# Patient Record
(demographics unavailable — no encounter records)

---

## 2024-10-25 NOTE — DISCUSSION/SUMMARY
[de-identified] : This is a 44-year-old female accompanied by her  complaining of ongoing left shoulder pain for many years. XR is non diagnostic  Patient complains of superior pain Maximum point of tenderness on LHBT and AC joint on exam  Plan: (1) persistent acromioclavicular joint pain and anterior long head of biceps tendon pain but improved range of motion status post debridement and capsulectomy by Dr. Chu.  Obtain MRI to determine if there is any structural abnormality.  Explained that this could be part of normal postoperative healing that could take more than 1 year from initial surgery to improve.      -Consider referral for diagnostic injection into the AC joint by interventional radiology.  Would consider in office long head of biceps tendon injection.  If cervical etiology could be ruled out, revision arthroscopic debridement with possible open AC joint and possible open biceps tenodesis would be the surgical treatment of choice of the shoulder.  (2) patient is status post two-level cervical fusion with MRI demonstrating significant left lateral recess disc encroaching on the C7 nerve root. Recommend second opinion by Dr. Carlson or Dr. Stevens to review MRI images to see if this is is problematic.  MRI to evaluate rotator cuff  Based on the patients history and physical exam findings, I am concerned about the possibility of a full-thickness rotator cuff tear. The patient has pain and subjective weakness consistent with this diagnosis. Therefore, I recommend an MRI to evaluate for a rotator cuff tear. This will also aid in evaluating for injury to the biceps labral complex and for any signs of impingement. The patient will follow-up after MRI to discuss further treatment options.     (1) We discussed a comprehensive treatment plans that included a prescription management plan involving the use of prescription strength medications to include Ibuprofen 600-800 mg TID, versus 500-650 mg Tylenol. We also discussed prescribing topical diclofenac (Voltaren gel) as well as once daily Meloxicam 15 mg. (2) The patient has More Than One chronic injuries/illnesses as outlined, discussed, and documented by ICD 10 codes listed, as well as the HPI and Plan section. There is a moderate risk of morbidity with further treatment, especially from use of prescription strength medications and possible side effects of these medications which include upset stomach and cardiac/renal issues with long term use were discussed. (3) I recommended that the patient follow-up with their medical physician to discuss any significant specific potential issues with long term use such as interactions with current medications or with exacerbation of underlying medical morbidities.   Attestation: I, Charisma REED'[place] , attest that this documentation has been prepared under the direction and in the presence of Provider Jaime Dumont MD. The documentation recorded by the scribe, in my presence, accurately reflects the service I personally performed, and the decisions made by me with my edits as appropriate. Jaime Dumont MD

## 2024-10-25 NOTE — PHYSICAL EXAM
[Left] : left shoulder [There are no fractures, subluxations or dislocations. No significant abnormalities are seen] : There are no fractures, subluxations or dislocations. No significant abnormalities are seen [Type 2 acromion] : Type 2 acromion [de-identified] : Constitutional: The general appearance of the patient is well developed, well nourished, no deformities and well groomed. Normal   Gait: Gait and function is as follows: normal gait.   Skin: Head and neck visualized skin is normal. Left upper extremity visualized skin is normal. Right upper extremity visualized skin is normal. Thoracic Skin of the thoracic spine shows visualized skin is normal.   Cardiovascular: palpable radial pulse bilaterally, good capillary refill in digits of the bilateral upper extremities and no temperature or color changes in the bilateral upper extremities.   Lymphatic: Normal Palpation of lymph nodes in the cervical.   Neurologic: fine motor control in the bilateral upper extremities is intact. Deep Tendon Reflexes in Upper and Lower Extremities Negative Trimble's in the bilateral upper extremities. The patient is oriented to time, place and person. Sensation to light touch intact in the bilateral upper extremities. Mood and Affect is normal.   Right Shoulder: Inspection of the shoulder/upper arm is as follows: There is a full, pain-free, stable range of motion of the shoulder with normal strength and no tenderness to palpation.      Left Shoulder: Inspection of the shoulder/upper arm is as follows: Stable shoulder. Palpation of the shoulder/upper arm is as follows: There is tenderness at the AC joint, proximal biceps tendon and supraspinatus tendon. Range of motion of the shoulder is as follows: Pain with internal rotation, external rotation, abduction and forward flexion. Strength of the shoulder is as follows: Supraspinatus 4/5. External Rotation 4/5. Internal Rotation 4/5. Infraspinatus 5/5 4/5. Deltoid 5/5 Ligament Stability and Special Tests of the shoulder is as follows: Neer test is positive. Dillon' test is positive. Speed's test is positive.  Neck: Inspection / Palpation of the cervical spine is as follows: There is a mild restricted range of motion of the cervical spine. Increase tone and mild tenderness to palpation. Stable ROM of cervical spine.   Back, including spine: Inspection / Palpation of the thoracic/lumbar spine is as follows: There is a full, pain free, stable range of motion of the thoracic spine with a normal tone and not tenderness to palpation..

## 2024-10-25 NOTE — HISTORY OF PRESENT ILLNESS
[de-identified] : This is a 44-year-old female accompanied by her  complaining of ongoing left shoulder pain for many years.  Has been under the care of several orthopedist patient underwent C5 C7 ACDF by Dr. Boyle 6/23.  Also underwent left shoulder arthroscopic debridement distal clavicle excision per Dr. Chu March 2024. Patient continues to report left shoulder superior and anterior pain despite physical therapy.  Overall pain has improved following her shoulder surgery.  Denies any current radicular pain. Presents for second opinion to left shoulder.  Reports she had AC joint injection 6/24 which provided 2 weeks of pain relief. Denies any recent imaging of the shoulder.

## 2024-11-15 NOTE — DATA REVIEWED
[FreeTextEntry1] : Left SH MRI [facility] 11/5/24 Moderate AC joint arthrosis.  Mild widening consistent with postsurgical changes with small AC effusion. Supraspinatus tendinosis with no evidence of tear. Shallow tear associated with superior labral junction.

## 2024-11-15 NOTE — HISTORY OF PRESENT ILLNESS
[de-identified] : This is a 44-year-old female accompanied by her  complaining of ongoing left shoulder pain for many years.  Has been under the care of several orthopedist patient underwent C5 C7 ACDF by Dr. Boyle 6/23.  Also underwent left shoulder arthroscopic debridement distal clavicle excision per Dr. Chu March 2024. Patient continues to report left shoulder superior and anterior pain despite physical therapy.  Overall pain has improved following her shoulder surgery.  Denies any current radicular pain. Presents for second opinion to left shoulder.  Reports she had AC joint injection 6/24 which provided 2 weeks of pain relief. Denies any recent imaging of the shoulder.  11/15/24: Patient returns today for repeat evaluation of left shoulder and neck pain.  She reports severe exacerbation of pain over the last several days after her office visit with a spine specialist.  Continues to note superior shoulder pain.  Range of motion is limited at this point.  Returns today to review MRI of her left shoulder.  Exam today was completed with  ID number [id number].

## 2024-11-15 NOTE — DISCUSSION/SUMMARY
[de-identified] : This is a 44-year-old female accompanied by her  complaining of ongoing left shoulder pain for many years. XR is non diagnostic  Patient underwent previous cervical spine surgery by Dr. Boyle June 2023 as well as left shoulder surgery by Dr. Meeks in March 2024. She continues to have severe left-sided neck and shoulder pain ongoing for more than 1 year.  Today we personally reviewed the MRI of the left shoulder which does demonstrate moderate AC arthrosis with mild resection.  Patient does have severe superior shoulder pain on exam today. We discussed the possibility of pain being referred from this AC joint however given the extent of her pain and guarding on exam it is likely a result of her cervical spine residual stenosis. With respect to her left shoulder we recommended ultrasound-guided AC joint injection to be performed by musculoskeletal radiologist. Patient was encouraged to gauge the amount of relief she feels from her injection in an effort to isolate how much pain is associated with her shoulder. Discussed if she experiences severe pain relief we could consider open AC joint stabilization and possible open bicep tenodesis.  With respect to her cervical spine patient was evaluate by Dr. Carlson and is recommending EMG as well as possibility of cervical spine epidural from pain management. Patient complains of superior pain Maximum point of tenderness on LHBT and AC joint on exam   Patient will follow-up 2 to 3 weeks after her AC joint injection to discuss pain relief.   (1) We discussed a comprehensive treatment plans that included a prescription management plan involving the use of prescription strength medications to include Ibuprofen 600-800 mg TID, versus 500-650 mg Tylenol. We also discussed prescribing topical diclofenac (Voltaren gel) as well as once daily Meloxicam 15 mg. (2) The patient has More Than One chronic injuries/illnesses as outlined, discussed, and documented by ICD 10 codes listed, as well as the HPI and Plan section. There is a moderate risk of morbidity with further treatment, especially from use of prescription strength medications and possible side effects of these medications which include upset stomach and cardiac/renal issues with long term use were discussed. (3) I recommended that the patient follow-up with their medical physician to discuss any significant specific potential issues with long term use such as interactions with current medications or with exacerbation of underlying medical morbidities.   Attestation: I, Charisma Magana , attest that this documentation has been prepared under the direction and in the presence of Provider Jaime Dumont MD. The documentation recorded by the scribe, in my presence, accurately reflects the service I personally performed, and the decisions made by me with my edits as appropriate. Jaime Dumont MD

## 2024-11-15 NOTE — PHYSICAL EXAM
[Left] : left shoulder [There are no fractures, subluxations or dislocations. No significant abnormalities are seen] : There are no fractures, subluxations or dislocations. No significant abnormalities are seen [Type 2 acromion] : Type 2 acromion [de-identified] : Constitutional: The general appearance of the patient is well developed, well nourished, no deformities and well groomed. Normal   Gait: Gait and function is as follows: normal gait.   Skin: Head and neck visualized skin is normal. Left upper extremity visualized skin is normal. Right upper extremity visualized skin is normal. Thoracic Skin of the thoracic spine shows visualized skin is normal.   Cardiovascular: palpable radial pulse bilaterally, good capillary refill in digits of the bilateral upper extremities and no temperature or color changes in the bilateral upper extremities.   Lymphatic: Normal Palpation of lymph nodes in the cervical.   Neurologic: fine motor control in the bilateral upper extremities is intact. Deep Tendon Reflexes in Upper and Lower Extremities Negative Trimble's in the bilateral upper extremities. The patient is oriented to time, place and person. Sensation to light touch intact in the bilateral upper extremities. Mood and Affect is normal.   Right Shoulder: Inspection of the shoulder/upper arm is as follows: There is a full, pain-free, stable range of motion of the shoulder with normal strength and no tenderness to palpation.      Left Shoulder: Inspection of the shoulder/upper arm is as follows: Stable shoulder. Palpation of the shoulder/upper arm is as follows: There is tenderness at the AC joint, proximal biceps tendon and supraspinatus tendon. Range of motion of the shoulder is as follows: Pain with internal rotation, external rotation, abduction and forward flexion. Strength of the shoulder is as follows: Supraspinatus 4/5. External Rotation 4/5. Internal Rotation 4/5. Infraspinatus 5/5 4/5. Deltoid 5/5 Ligament Stability and Special Tests of the shoulder is as follows: Neer test is positive. Dillon' test is positive. Speed's test is positive.  Neck: Inspection / Palpation of the cervical spine is as follows: There is a mild restricted range of motion of the cervical spine. Increase tone and mild tenderness to palpation. Stable ROM of cervical spine.   Back, including spine: Inspection / Palpation of the thoracic/lumbar spine is as follows: There is a full, pain free, stable range of motion of the thoracic spine with a normal tone and not tenderness to palpation..

## 2024-11-16 NOTE — HISTORY OF PRESENT ILLNESS
[de-identified] : 11/13/2024 - Patient presents to the office today for evaluation of neck pain and left arm pain. She is seen at the request of Dr. Dumont. Patient has a history of prior cervical disc herniation and prior anterior cervical disc infusion 6/12/23 with Dr. Boyle. Following procedure patient continue to experience pain in neck and left upper extremity. Further evaluation indicated left shoulder internal arrangement and she underwent left shoulder debridement on 3/24/24 with Dr. Farhad Hammond. Continue to experience numbness tingling into the fourth and fifth fingers on her left hand. She feels there is some subjective weakness in the left upper activity carrying items at times. She also complains of decreased cervical range of motion. She is right hand dominant. She has recent MRI. Has not had recent EMG or recent injections.   Injury Details: The patient is a 44 year female who presents today complaining of neck pain radiating Date of Injury/Onset: 2022 Pain:    At Rest: 7-8/10  With Activity:  7-8/10  Mechanism of injury: NKI Quality of symptoms: radiating, numbness, tingling, dull pain Improves with: nothing Worse with: constant Prior treatment: surgery, physical therapy, epidural, cyclobenzaprine, Ibuprofen. celebrex, MDP Prior Imaging: [facility] Cervical Spine 09/17/2024 Additional Information: None

## 2024-11-16 NOTE — HISTORY OF PRESENT ILLNESS
[de-identified] : 11/13/2024 - Patient presents to the office today for evaluation of neck pain and left arm pain. She is seen at the request of Dr. Dumont. Patient has a history of prior cervical disc herniation and prior anterior cervical disc infusion 6/12/23 with Dr. Boyle. Following procedure patient continue to experience pain in neck and left upper extremity. Further evaluation indicated left shoulder internal arrangement and she underwent left shoulder debridement on 3/24/24 with Dr. Farhad Hammond. Continue to experience numbness tingling into the fourth and fifth fingers on her left hand. She feels there is some subjective weakness in the left upper activity carrying items at times. She also complains of decreased cervical range of motion. She is right hand dominant. She has recent MRI. Has not had recent EMG or recent injections.   Injury Details: The patient is a 44 year female who presents today complaining of neck pain radiating Date of Injury/Onset: 2022 Pain:    At Rest: 7-8/10  With Activity:  7-8/10  Mechanism of injury: NKI Quality of symptoms: radiating, numbness, tingling, dull pain Improves with: nothing Worse with: constant Prior treatment: surgery, physical therapy, epidural, cyclobenzaprine, Ibuprofen. celebrex, MDP Prior Imaging: [facility] Cervical Spine 09/17/2024 Additional Information: None

## 2024-11-16 NOTE — DATA REVIEWED
[FreeTextEntry1] : On my interpretation of these images and reports MRI cervical spine [facility] 9/17/24 noncontrast. Status post ACDF c5 through C7. Report indicates broad-based left power central disc protrusion 1.1 X0.3 cm with herniated disc material indenting the eagle sack, causing slight mass effect on the C7 nerve in the left lateral recess C-5/6 right parav central osteophyte disc complex  I stop paperwork reviewed

## 2024-11-16 NOTE — PHYSICAL EXAM
[Normal Coordination] : normal coordination [Normal DTR UE/LE] : normal DTR UE/LE  [Normal Sensation] : normal sensation [Normal Mood and Affect] : normal mood and affect [Oriented] : oriented [Able to Communicate] : able to communicate [Normal Skin] : normal skin [No Rash] : no rash [No Ulcers] : no ulcers [No Lesions] : no lesions [No obvious lymphadenopathy in areas examined] : no obvious lymphadenopathy in areas examined [Well Developed] : well developed [Peripheral vascular exam is grossly normal] : peripheral vascular exam is grossly normal [No Respiratory Distress] : no respiratory distress [Lungs clear to auscultation bilaterally] : lungs clear to auscultation bilaterally [Normal Bowel Sounds] : normal bowel sounds [Non-Tender] : non-tender [No HSM] : no HSM [No Mass] : no mass [Biceps 2+] : biceps 2+ [Triceps 2+] : triceps 2+ [Brachioradialis 2+] : brachioradialis 2+ [] : positive Trimble reflex [FreeTextEntry3] : positive for well healed anterior incision left side of the neck.  [FreeTextEntry9] : Reduced range of motion left lateral rotation 45 left lateral bending 20. Forward flexion and extension are normal, but also reproduce pain.

## 2024-11-16 NOTE — DATA REVIEWED
[FreeTextEntry1] : On my interpretation of these images and reports MRI cervical spine [facility] 9/17/24 noncontrast. Status post ACDF c5 through C7. Report indicates broad-based left power central disc protrusion 1.1 X0.3 cm with herniated disc material indenting the eagle sack, causing slight mass effect on the C7 nerve in the left lateral recess C-5/6 right parav central osteophyte disc complex  I stop paperwork reviewed

## 2024-11-16 NOTE — HISTORY OF PRESENT ILLNESS
[de-identified] : 11/13/2024 - Patient presents to the office today for evaluation of neck pain and left arm pain. She is seen at the request of Dr. Dumont. Patient has a history of prior cervical disc herniation and prior anterior cervical disc infusion 6/12/23 with Dr. Boyle. Following procedure patient continue to experience pain in neck and left upper extremity. Further evaluation indicated left shoulder internal arrangement and she underwent left shoulder debridement on 3/24/24 with Dr. Farhad Hammond. Continue to experience numbness tingling into the fourth and fifth fingers on her left hand. She feels there is some subjective weakness in the left upper activity carrying items at times. She also complains of decreased cervical range of motion. She is right hand dominant. She has recent MRI. Has not had recent EMG or recent injections.   Injury Details: The patient is a 44 year female who presents today complaining of neck pain radiating Date of Injury/Onset: 2022 Pain:    At Rest: 7-8/10  With Activity:  7-8/10  Mechanism of injury: NKI Quality of symptoms: radiating, numbness, tingling, dull pain Improves with: nothing Worse with: constant Prior treatment: surgery, physical therapy, epidural, cyclobenzaprine, Ibuprofen. celebrex, MDP Prior Imaging: [facility] Cervical Spine 09/17/2024 Additional Information: None

## 2024-11-16 NOTE — DATA REVIEWED
[FreeTextEntry1] : On my interpretation of these images and reports [facility] 9[facility]17/24 noncontrast. Status post ACDF c5 through C7. Report indicates broad-based left power central disc protrusion 1.1 X0.3 cm with herniated disc material indenting the eagle sack, causing slight mass effect on the C7 nerve in the left lateral recess C-5/6 right parav central osteophyte disc complex  I stop paperwork reviewed

## 2024-12-06 NOTE — DISCUSSION/SUMMARY
[de-identified] : This is a 44-year-old female with ongoing left shoulder and neck pain despite multiple surgeries.  Patient underwent previous cervical spine surgery by Dr. Boyle June 2023 as well as left shoulder surgery by Dr. Meeks in March 2024. She continues to have severe left-sided neck and shoulder pain ongoing for more than 1 year.  Today we personally reviewed the MRI of the left shoulder which does demonstrate moderate AC arthrosis with mild resection.  She was referred to [facility] for outside ultrasound-guided AC joint injection however she reported very mild improvement. We discussed the fact that considering injection did not provide drastic relief her pain is likely referred from her cervical spine.  Patient does have pain management consult 12/10 with Dr. Cortez to discuss the possibility of cervical epidural. At this point we would not recommend any additional shoulder surgery until she has further evaluation from the cervical spine team.   (1) We discussed a comprehensive treatment plans that included a prescription management plan involving the use of prescription strength medications to include Ibuprofen 600-800 mg TID, versus 500-650 mg Tylenol. We also discussed prescribing topical diclofenac (Voltaren gel) as well as once daily Meloxicam 15 mg. (2) The patient has More Than One chronic injuries/illnesses as outlined, discussed, and documented by ICD 10 codes listed, as well as the HPI and Plan section. There is a moderate risk of morbidity with further treatment, especially from use of prescription strength medications and possible side effects of these medications which include upset stomach and cardiac/renal issues with long term use were discussed. (3) I recommended that the patient follow-up with their medical physician to discuss any significant specific potential issues with long term use such as interactions with current medications or with exacerbation of underlying medical morbidities.

## 2024-12-06 NOTE — PHYSICAL EXAM
[de-identified] : Constitutional: The general appearance of the patient is well developed, well nourished, no deformities and well groomed. Normal   Gait: Gait and function is as follows: normal gait.   Skin: Head and neck visualized skin is normal. Left upper extremity visualized skin is normal. Right upper extremity visualized skin is normal. Thoracic Skin of the thoracic spine shows visualized skin is normal.   Cardiovascular: palpable radial pulse bilaterally, good capillary refill in digits of the bilateral upper extremities and no temperature or color changes in the bilateral upper extremities.   Lymphatic: Normal Palpation of lymph nodes in the cervical.   Neurologic: fine motor control in the bilateral upper extremities is intact. Deep Tendon Reflexes in Upper and Lower Extremities Negative Trimble's in the bilateral upper extremities. The patient is oriented to time, place and person. Sensation to light touch intact in the bilateral upper extremities. Mood and Affect is normal.   Right Shoulder: Inspection of the shoulder/upper arm is as follows: There is a full, pain-free, stable range of motion of the shoulder with normal strength and no tenderness to palpation.      Left Shoulder: Inspection of the shoulder/upper arm is as follows: Stable shoulder. Palpation of the shoulder/upper arm is as follows: There is tenderness at the AC joint, proximal biceps tendon and supraspinatus tendon. Range of motion of the shoulder is as follows: Pain with internal rotation, external rotation, abduction and forward flexion. Strength of the shoulder is as follows: Supraspinatus 4/5. External Rotation 4/5. Internal Rotation 4/5. Infraspinatus 5/5 4/5. Deltoid 5/5 Ligament Stability and Special Tests of the shoulder is as follows: Neer test is positive. Dillon' test is positive. Speed's test is positive.  Neck: Inspection / Palpation of the cervical spine is as follows: There is a mild restricted range of motion of the cervical spine. Increase tone and mild tenderness to palpation. Stable ROM of cervical spine.   Back, including spine: Inspection / Palpation of the thoracic/lumbar spine is as follows: There is a full, pain free, stable range of motion of the thoracic spine with a normal tone and not tenderness to palpation..

## 2024-12-06 NOTE — HISTORY OF PRESENT ILLNESS
[de-identified] : This is a 44-year-old female accompanied by her  complaining of ongoing left shoulder pain for many years.  Has been under the care of several orthopedist patient underwent C5 C7 ACDF by Dr. Boyle 6/23.  Also underwent left shoulder arthroscopic debridement distal clavicle excision per Dr. Chu March 2024. Patient continues to report left shoulder superior and anterior pain despite physical therapy.  Overall pain has improved following her shoulder surgery.  Denies any current radicular pain. Presents for second opinion to left shoulder.  Reports she had AC joint injection 6/24 which provided 2 weeks of pain relief. Denies any recent imaging of the shoulder.  11/15/24: Patient returns today for repeat evaluation of left shoulder and neck pain.  She reports severe exacerbation of pain over the last several days after her office visit with a spine specialist.  Continues to note superior shoulder pain.  Range of motion is limited at this point.  Returns today to review MRI of her left shoulder.  Exam today was completed with  ID number [id number].  12/6/24: Patient returns today for follow-up of left shoulder and cervical spine pain.  She was previously referred for outside ultrasound-guided AC joint injection from anger which she reported mild improvement.  Continues to note left upper extremity radicular pain.  Also completed EMG and is pending follow-up appointment with Dr. Cortez.  Exam today was completed with  ID number [id number]

## 2024-12-10 NOTE — PHYSICAL EXAM
[de-identified] : Constitutional: - No acute distress - Well developed; well nourished   Neurological: - normal mood and affect - alert and oriented x 3   Cardiovascular: - grossly normal  Cervical Spine Exam:   Inspection: erythema (-) ecchymosis (-) rashes (-) Well-healed anterior neck scar   Palpation:                                               Cervical paraspinal tenderness:         R (+); L (+) Upper trapezius tenderness:              R (+); L (+) Rhomboids tenderness:                      R (-); L (-) Occipital Ridge:                                   R (-); L (-) Supraspinatus tenderness:                 R (-); L (-)   ROM: Reduced range of motion all planes   Pain throughout ROM testing   Strength Testing:            Right    Left Deltoid                             (5/5)    (5/5) Biceps:                            (5/5)    (5/5) Triceps:                           (5/5)    (5/5) Finger Abductors:           (5/5)    (5/5) Grasp:                             (5/5)    (5/5)   Special Testing: Spurling Test:                  R (-); L (+) Facet load test:               R (-); L (+) Trimble test:                  R (-); L (-)   Neuro: SILT throughout right upper extremity SI LT throughout left upper extremity   Reflexes: Biceps   -           R (2+); L (2+) Triceps  -           R (2+); L (2+) Brachioradialis- R (2+); L (2+)   No ankle clonus

## 2024-12-10 NOTE — REASON FOR VISIT
[Initial Consultation] : an initial pain management consultation [FreeTextEntry2] : neck/left arm pain

## 2024-12-10 NOTE — ASSESSMENT
[FreeTextEntry1] : We discussed the nature of the underlying pathology and available pain management treatment options. These included interventional, rehabilitative, pharmacological, and complementary modalities. We will proceed with the following:    Interventional treatment options: - Proceed with left PM C7-T1 KYM with fluoroscopic guidance - Defer interventional therapy for the left shoulder to orthopedic surgery - see additional instructions below    Rehabilitative options: - Continue physical therapy as per orthopedics - Participation in active HEP was discussed and encouraged as tolerated   Medication based treatment options: - Continue OTC NSAIDs on as-needed basis - See additional instructions below    Complementary treatment options: - Weight management and lifestyle modifications discussed   Additional treatment recommendations as follows: - patient will follow up with Dr. Carlson as directed - Patient was counseled as to red flag signs and when to seek urgent care. - Follow up 1-2 weeks post injection for assessment of efficacy and further treatment recommendations  The risks, benefits and alternatives of the proposed procedure were explained in detail with the patient. The risks outlined include but are not limited to infection, bleeding, post- dural puncture headache, nerve injury, a temporary increase in pain, failure to resolve symptoms, need for future interventions, allergic reaction, and possible elevation of blood sugar in diabetics if using corticosteroid.  All questions were answered to patient's apparent satisfaction, and he/she verbalized an understanding.  We have discussed the risks, benefits, and alternatives for NSAID therapy including but not limited to the risk of bleeding, thrombosis, gastric mucosal irritation/ulceration, allergic reaction and kidney dysfunction.  The patient was counseled to utilize NSAIDs concurrently with food and/or a PPI if applicable.  They were counseled to use the lowest effective dose for the shortest duration. The patient verbalizes an understanding.

## 2024-12-10 NOTE — PHYSICAL EXAM
[de-identified] : Constitutional: - No acute distress - Well developed; well nourished   Neurological: - normal mood and affect - alert and oriented x 3   Cardiovascular: - grossly normal  Cervical Spine Exam:   Inspection: erythema (-) ecchymosis (-) rashes (-) Well-healed anterior neck scar   Palpation:                                               Cervical paraspinal tenderness:         R (+); L (+) Upper trapezius tenderness:              R (+); L (+) Rhomboids tenderness:                      R (-); L (-) Occipital Ridge:                                   R (-); L (-) Supraspinatus tenderness:                 R (-); L (-)   ROM: Reduced range of motion all planes   Pain throughout ROM testing   Strength Testing:            Right    Left Deltoid                             (5/5)    (5/5) Biceps:                            (5/5)    (5/5) Triceps:                           (5/5)    (5/5) Finger Abductors:           (5/5)    (5/5) Grasp:                             (5/5)    (5/5)   Special Testing: Spurling Test:                  R (-); L (+) Facet load test:               R (-); L (+) Trimble test:                  R (-); L (-)   Neuro: SILT throughout right upper extremity SI LT throughout left upper extremity   Reflexes: Biceps   -           R (2+); L (2+) Triceps  -           R (2+); L (2+) Brachioradialis- R (2+); L (2+)   No ankle clonus

## 2024-12-10 NOTE — HISTORY OF PRESENT ILLNESS
[Neck] : neck [8] : 8 [4] : 4 [Burning] : burning [Dull/Aching] : dull/aching [Radiating] : radiating [Sharp] : sharp [Tightness] : tightness [Constant] : constant [Household chores] : household chores [Leisure] : leisure [Sleep] : sleep [Nothing helps with pain getting better] : Nothing helps with pain getting better [Sitting] : sitting [Walking] : walking [Lying in bed] : lying in bed [FreeTextEntry1] : 12/10/2024 - The patient presents for initial evaluation regarding her neck and left arm pain.  Patient was referred by Dr. Carlson.  Visit conducted with assistance of  Isamar Hughes.  Patient reports ongoing neck pain with radiation to left mid scapular area as well as the left posterior arm extending down to the hand.  Symptom distribution is described as 50% neck/50% left arm pain.  She underwent a C5-C7 ACDF on 6/12/2023 with Dr. Redd Boyle.  She reports some reduction of her symptoms following surgery.  She underwent subsequent left shoulder arthroscopy however which did not change her symptoms to any appreciable degree.  Has been actively involved in physical therapy in the past without benefit.  Has had a second opinion orthopedically with regard to her left shoulder with Dr. Dumont.  Attempted a left AC joint injection without meaningful relief.  Symptoms attributed to the cervical spine.   Subjective Weakness: No Numbness/Tingling: No Bladder/Bowel dysfunction: No Gait Abnormalities: No Fine motor coordination changes: No   Injections: Yes 1) left AC joint CSI with U/S guidance (11/15/2024) - Dr. Dumont   Pertinent Surgical History: 1) C5-C7 ACDF (6/12/23) - Dr. Boyle 2) Left shoulder debridement (3/24/24) - Dr. Farhad Chu   Imaging: MRI Cervical Spine (9/17/2024) - [facility]  Electrodiagnostic Studies: 1) EMG/NCV (11/26/2024) - [facility] Impression: 1) mild left cubital tunnel 2) mild bilateral carpal tunnel 3) predominately chronic bilateral C5, C6, C7 radiculopathy   Physician Disclaimer: I have personally reviewed and confirmed all HPI data with the patient. [] : no [FreeTextEntry6] : Pressure, numbness  [FreeTextEntry7] : LEFT ARM  [FreeTextEntry8] : driving  [de-identified] : driving  [de-identified] : C MRI AT [facility] SUITE

## 2024-12-10 NOTE — HISTORY OF PRESENT ILLNESS
[Neck] : neck [8] : 8 [4] : 4 [Burning] : burning [Dull/Aching] : dull/aching [Radiating] : radiating [Sharp] : sharp [Tightness] : tightness [Constant] : constant [Household chores] : household chores [Leisure] : leisure [Sleep] : sleep [Nothing helps with pain getting better] : Nothing helps with pain getting better [Sitting] : sitting [Walking] : walking [Lying in bed] : lying in bed [FreeTextEntry1] : 12/10/2024 - The patient presents for initial evaluation regarding her neck and left arm pain.  Patient was referred by Dr. Carlson.  Visit conducted with assistance of  Isamar Hughes.  Patient reports ongoing neck pain with radiation to left mid scapular area as well as the left posterior arm extending down to the hand.  Symptom distribution is described as 50% neck/50% left arm pain.  She underwent a C5-C7 ACDF on 6/12/2023 with Dr. Redd Boyle.  She reports some reduction of her symptoms following surgery.  She underwent subsequent left shoulder arthroscopy however which did not change her symptoms to any appreciable degree.  Has been actively involved in physical therapy in the past without benefit.  Has had a second opinion orthopedically with regard to her left shoulder with Dr. Dumont.  Attempted a left AC joint injection without meaningful relief.  Symptoms attributed to the cervical spine.   Subjective Weakness: No Numbness/Tingling: No Bladder/Bowel dysfunction: No Gait Abnormalities: No Fine motor coordination changes: No   Injections: Yes 1) left AC joint CSI with U/S guidance (11/15/2024) - Dr. Dumont   Pertinent Surgical History: 1) C5-C7 ACDF (6/12/23) - Dr. Boyle 2) Left shoulder debridement (3/24/24) - Dr. Farhad Chu   Imaging: MRI Cervical Spine (9/17/2024) - [facility]  Electrodiagnostic Studies: 1) EMG/NCV (11/26/2024) - [facility] Impression: 1) mild left cubital tunnel 2) mild bilateral carpal tunnel 3) predominately chronic bilateral C5, C6, C7 radiculopathy   Physician Disclaimer: I have personally reviewed and confirmed all HPI data with the patient. [] : no [FreeTextEntry6] : Pressure, numbness  [FreeTextEntry7] : LEFT ARM  [FreeTextEntry8] : driving  [de-identified] : driving  [de-identified] : C MRI AT [facility] SUITE

## 2025-01-15 NOTE — PROCEDURE
[FreeTextEntry3] : Date of Service: 01/15/2025   Account: [id number]  Patient: CAROLYN BRANDON   YOB: 1979  Age: 45 year  Surgeon:      Adiel Cortez D.O.  Assistant:    None  Pre-Operative Diagnosis:         Cervical Radiculopathy (M54.12)  Post Operative Diagnosis:       Cervical Radiculopathy (M54.12)  Procedure:             Cervical (C7-T1) interlaminar epidural steroid injection under fluoroscopic guidance  Anesthesia:  MAC  This procedure was carried out using fluoroscopic guidance.  The risks and benefits of the procedure were discussed extensively with the patient.  The consent of the patient was obtained and the following procedure was performed.  A timeout was performed with all essential staff present and the site and side were verified.  The patient was placed in the prone position and optimized to patient comfort.  The cervical area was prepped and draped in a sterile fashion.  The fluoroscope visualized the C7-T1 interspace using slight cephalad-caudad angulation and this area was marked.  Using sterile technique, the superficial skin was anesthetized with 1% Lidocaine.  A 20-gauge 3.5-inch Tuohy needle was advanced under fluoroscopy until ligament was engaged.  Using a contralateral oblique view, a "loss of resistance" to air technique was utilized in order to gain access to the epidural space.  After negative aspiration for heme and CSF, 1 cc of Omnipaque contrast was administered and the appropriate cervical epidurogram was obtained in the CACHORRO and A/P view as well as digital subtraction angiography.  A total injectate of 3 cc of preservative free normal saline and 40 mg of Kenalog was then injected into the epidural space while maintaining meaningful verbal contact with the patient.    Vital signs remained normal throughout the procedure.  The patient tolerated the procedure well.  There were no immediate complications from the performed procedure.  The patient was instructed to apply ice over the injection sites for twenty minutes every two hours for the next 24 hours.  Disposition:      1. The patient was advised to F/U in 1-2 weeks to assess the response to the injection.      2. The patient was also instructed to contact me immediately if there were any concerns related to the procedure performed.

## 2025-02-02 NOTE — HISTORY OF PRESENT ILLNESS
[de-identified] : 01/29/2025: Patient presents for a follow up visit. Patient had seen pain management and got an injection in her shoulder and one in her neck on 1/15/25 and states she got mild relief from the neck injection. She states she has numbness into her ring and pinky finger on her left side. A  was used today ID# [id number]  This is a 44-year-old female accompanied by her  complaining of ongoing left shoulder pain for many years.  Has been under the care of several orthopedist patient underwent C5 C7 ACDF by Dr. Boyle 6/23.  Also underwent left shoulder arthroscopic debridement distal clavicle excision per Dr. Chu March 2024. Patient continues to report left shoulder superior and anterior pain despite physical therapy.  Overall pain has improved following her shoulder surgery.  Denies any current radicular pain. Presents for second opinion to left shoulder.  Reports she had AC joint injection 6/24 which provided 2 weeks of pain relief. Denies any recent imaging of the shoulder.  11/15/24: Patient returns today for repeat evaluation of left shoulder and neck pain.  She reports severe exacerbation of pain over the last several days after her office visit with a spine specialist.  Continues to note superior shoulder pain.  Range of motion is limited at this point.  Returns today to review MRI of her left shoulder.  Exam today was completed with  ID number [id number].  12/6/24: Patient returns today for follow-up of left shoulder and cervical spine pain.  She was previously referred for outside ultrasound-guided AC joint injection from [facility] which she reported mild improvement.  Continues to note left upper extremity radicular pain.  Also completed EMG and is pending follow-up appointment with Dr. Cortez.  Exam today was completed with  ID number [id number]

## 2025-02-02 NOTE — PHYSICAL EXAM
[Normal Coordination] : normal coordination [Normal DTR UE/LE] : normal DTR UE/LE  [Normal Sensation] : normal sensation [Normal Mood and Affect] : normal mood and affect [Oriented] : oriented [Able to Communicate] : able to communicate [Normal Skin] : normal skin [No Rash] : no rash [No Ulcers] : no ulcers [No Lesions] : no lesions [No obvious lymphadenopathy in areas examined] : no obvious lymphadenopathy in areas examined [Well Developed] : well developed [Peripheral vascular exam is grossly normal] : peripheral vascular exam is grossly normal [No Respiratory Distress] : no respiratory distress [de-identified] : Constitutional: The general appearance of the patient is well developed, well nourished, no deformities and well groomed. Normal   Gait: Gait and function is as follows: normal gait.   Skin: Head and neck visualized skin is normal. Left upper extremity visualized skin is normal. Right upper extremity visualized skin is normal. Thoracic Skin of the thoracic spine shows visualized skin is normal.   Cardiovascular: palpable radial pulse bilaterally, good capillary refill in digits of the bilateral upper extremities and no temperature or color changes in the bilateral upper extremities.   Lymphatic: Normal Palpation of lymph nodes in the cervical.   Neurologic: fine motor control in the bilateral upper extremities is intact. Deep Tendon Reflexes in Upper and Lower Extremities Negative Trimble's in the bilateral upper extremities. The patient is oriented to time, place and person. Sensation to light touch intact in the bilateral upper extremities. Mood and Affect is normal.   Right Shoulder: Inspection of the shoulder/upper arm is as follows: There is a full, pain-free, stable range of motion of the shoulder with normal strength and no tenderness to palpation.      Left Shoulder: Inspection of the shoulder/upper arm is as follows: Stable shoulder. Palpation of the shoulder/upper arm is as follows: There is tenderness at the AC joint, proximal biceps tendon and supraspinatus tendon. Range of motion of the shoulder is as follows: Pain with internal rotation, external rotation, abduction and forward flexion. Strength of the shoulder is as follows: Supraspinatus 4/5. External Rotation 4/5. Internal Rotation 4/5. Infraspinatus 5/5 4/5. Deltoid 5/5 Ligament Stability and Special Tests of the shoulder is as follows: Neer test is positive. Dillon' test is positive. Speed's test is positive.  Neck: Inspection / Palpation of the cervical spine is as follows: There is a mild restricted range of motion of the cervical spine. Increase tone and mild tenderness to palpation. Stable ROM of cervical spine.   Back, including spine: Inspection / Palpation of the thoracic/lumbar spine is as follows: There is a full, pain free, stable range of motion of the thoracic spine with a normal tone and not tenderness to palpation..   [Extension] : extension [] : sensation of left upper extremities intact

## 2025-02-02 NOTE — DATA REVIEWED
[FreeTextEntry1] : Left SH MRI [facility] 11/5/24 Moderate AC joint arthrosis.  Mild widening consistent with postsurgical changes with small AC effusion. Supraspinatus tendinosis with no evidence of tear. Shallow tear associated with superior labral junction.  I stop paperwork reviewed Pain mgmt progress notes reviewed Ortho progress notes reviewed

## 2025-02-02 NOTE — PHYSICAL EXAM
[Normal Coordination] : normal coordination [Normal DTR UE/LE] : normal DTR UE/LE  [Normal Sensation] : normal sensation [Normal Mood and Affect] : normal mood and affect [Oriented] : oriented [Able to Communicate] : able to communicate [Normal Skin] : normal skin [No Rash] : no rash [No Ulcers] : no ulcers [No Lesions] : no lesions [No obvious lymphadenopathy in areas examined] : no obvious lymphadenopathy in areas examined [Well Developed] : well developed [Peripheral vascular exam is grossly normal] : peripheral vascular exam is grossly normal [No Respiratory Distress] : no respiratory distress [de-identified] : Constitutional: The general appearance of the patient is well developed, well nourished, no deformities and well groomed. Normal   Gait: Gait and function is as follows: normal gait.   Skin: Head and neck visualized skin is normal. Left upper extremity visualized skin is normal. Right upper extremity visualized skin is normal. Thoracic Skin of the thoracic spine shows visualized skin is normal.   Cardiovascular: palpable radial pulse bilaterally, good capillary refill in digits of the bilateral upper extremities and no temperature or color changes in the bilateral upper extremities.   Lymphatic: Normal Palpation of lymph nodes in the cervical.   Neurologic: fine motor control in the bilateral upper extremities is intact. Deep Tendon Reflexes in Upper and Lower Extremities Negative Trimble's in the bilateral upper extremities. The patient is oriented to time, place and person. Sensation to light touch intact in the bilateral upper extremities. Mood and Affect is normal.   Right Shoulder: Inspection of the shoulder/upper arm is as follows: There is a full, pain-free, stable range of motion of the shoulder with normal strength and no tenderness to palpation.      Left Shoulder: Inspection of the shoulder/upper arm is as follows: Stable shoulder. Palpation of the shoulder/upper arm is as follows: There is tenderness at the AC joint, proximal biceps tendon and supraspinatus tendon. Range of motion of the shoulder is as follows: Pain with internal rotation, external rotation, abduction and forward flexion. Strength of the shoulder is as follows: Supraspinatus 4/5. External Rotation 4/5. Internal Rotation 4/5. Infraspinatus 5/5 4/5. Deltoid 5/5 Ligament Stability and Special Tests of the shoulder is as follows: Neer test is positive. Dillon' test is positive. Speed's test is positive.  Neck: Inspection / Palpation of the cervical spine is as follows: There is a mild restricted range of motion of the cervical spine. Increase tone and mild tenderness to palpation. Stable ROM of cervical spine.   Back, including spine: Inspection / Palpation of the thoracic/lumbar spine is as follows: There is a full, pain free, stable range of motion of the thoracic spine with a normal tone and not tenderness to palpation..   [Extension] : extension [] : sensation of left upper extremities intact

## 2025-02-02 NOTE — DISCUSSION/SUMMARY
[de-identified] : This is a 44-year-old female with ongoing left shoulder and neck pain despite multiple surgeries.  Patient underwent previous cervical spine surgery by Dr. Boyle June 2023 as well as left shoulder surgery by Dr. Meeks in March 2024. She continues to have severe left-sided neck and shoulder pain ongoing for more than 1 year.  Since the patient did not have dramatic improvement from KYM we do not recommend any additional cervical surgery or epidural injections to the neck. At this time we will continue with trigger point injections and recommended acupuncture, referral Dr. Dunlap given today.     Follow up: 6 weeks    (1) We discussed a comprehensive treatment plans that included a prescription management plan involving the use of prescription strength medications to include Ibuprofen 600-800 mg TID, versus 500-650 mg Tylenol. We also discussed prescribing topical diclofenac (Voltaren gel) as well as once daily Meloxicam 15 mg. (2) The patient has More Than One chronic injuries/illnesses as outlined, discussed, and documented by ICD 10 codes listed, as well as the HPI and Plan section. There is a moderate risk of morbidity with further treatment, especially from use of prescription strength medications and possible side effects of these medications which include upset stomach and cardiac/renal issues with long term use were discussed. (3) I recommended that the patient follow-up with their medical physician to discuss any significant specific potential issues with long term use such as interactions with current medications or with exacerbation of underlying medical morbidities.    I, Renetta Clemens, attest that this documentation has been prepared under the direction and in the presence of Provider Ron Carlson MD.

## 2025-02-02 NOTE — HISTORY OF PRESENT ILLNESS
[de-identified] : 01/29/2025: Patient presents for a follow up visit. Patient had seen pain management and got an injection in her shoulder and one in her neck on 1/15/25 and states she got mild relief from the neck injection. She states she has numbness into her ring and pinky finger on her left side. A  was used today ID# [id number]  This is a 44-year-old female accompanied by her  complaining of ongoing left shoulder pain for many years.  Has been under the care of several orthopedist patient underwent C5 C7 ACDF by Dr. Boyle 6/23.  Also underwent left shoulder arthroscopic debridement distal clavicle excision per Dr. Chu March 2024. Patient continues to report left shoulder superior and anterior pain despite physical therapy.  Overall pain has improved following her shoulder surgery.  Denies any current radicular pain. Presents for second opinion to left shoulder.  Reports she had AC joint injection 6/24 which provided 2 weeks of pain relief. Denies any recent imaging of the shoulder.  11/15/24: Patient returns today for repeat evaluation of left shoulder and neck pain.  She reports severe exacerbation of pain over the last several days after her office visit with a spine specialist.  Continues to note superior shoulder pain.  Range of motion is limited at this point.  Returns today to review MRI of her left shoulder.  Exam today was completed with  ID number [id number].  12/6/24: Patient returns today for follow-up of left shoulder and cervical spine pain.  She was previously referred for outside ultrasound-guided AC joint injection from [facility] which she reported mild improvement.  Continues to note left upper extremity radicular pain.  Also completed EMG and is pending follow-up appointment with Dr. Cortez.  Exam today was completed with  ID number [id number]

## 2025-02-02 NOTE — DISCUSSION/SUMMARY
[de-identified] : This is a 44-year-old female with ongoing left shoulder and neck pain despite multiple surgeries.  Patient underwent previous cervical spine surgery by Dr. Boyle June 2023 as well as left shoulder surgery by Dr. Meeks in March 2024. She continues to have severe left-sided neck and shoulder pain ongoing for more than 1 year.  Since the patient did not have dramatic improvement from KYM we do not recommend any additional cervical surgery or epidural injections to the neck. At this time we will continue with trigger point injections and recommended acupuncture, referral Dr. Dunlap given today.     Follow up: 6 weeks    (1) We discussed a comprehensive treatment plans that included a prescription management plan involving the use of prescription strength medications to include Ibuprofen 600-800 mg TID, versus 500-650 mg Tylenol. We also discussed prescribing topical diclofenac (Voltaren gel) as well as once daily Meloxicam 15 mg. (2) The patient has More Than One chronic injuries/illnesses as outlined, discussed, and documented by ICD 10 codes listed, as well as the HPI and Plan section. There is a moderate risk of morbidity with further treatment, especially from use of prescription strength medications and possible side effects of these medications which include upset stomach and cardiac/renal issues with long term use were discussed. (3) I recommended that the patient follow-up with their medical physician to discuss any significant specific potential issues with long term use such as interactions with current medications or with exacerbation of underlying medical morbidities.    I, Renetta Clemens, attest that this documentation has been prepared under the direction and in the presence of Provider Ron Carlson MD.

## 2025-02-04 NOTE — HISTORY OF PRESENT ILLNESS
[FreeTextEntry1] : A  was used today ID#[id number] Patient is a 45-year-old female who presents for evaluation of CS and left shoulder pain.  Patient reports that she has been experiencing cervical spine pain for approximately 3 years.  Patient is status post cervical fusion performed by Dr. Boyle June 2023.  Patient also underwent arthroscopic acromioplasty with debridement performed by Dr. Chu March 2024.  Patient does report some improvement following cervical fusion.  However she continues to complain of cervical spine pain with intermittent radicular symptoms involving her left upper extremity.  She reports left shoulder pain with limited range of motion and weakness.  Patient reports that she underwent postoperative physical therapy to both her cervical spine and left shoulder.  Patient reports she also has undergone pain management intervention (cervical NAV) without significant improvement reported.  In addition a CSI injections to the left shoulder have been performed postoperatively once again without significant improvement reported.  Patient referred to my office today for evaluation of acupuncture/TPI to address myofascial component of her cervical and left shoulder complaints.

## 2025-02-04 NOTE — END OF VISIT
[FreeTextEntry3] :  The following information has been documented by Stephanie Ulloa acting as a scribe. The documentation recorded by the scribe, in my presence, accurately reflects the service I, Dr. Dunlap, personally performed, and the decisions made by me with my edits as appropriate.   This note was generated by using Dragon medical dictation software. A reasonable effort has been made for proofreading its contents, but typos may still remain. If there are any questions or points of clarification needed, please notify my office.

## 2025-02-04 NOTE — PHYSICAL EXAM
[FreeTextEntry1] : EXAMINATION OF THE CERVICAL SPINE AND UPPER EXTREMITIES: Upon inspection: Anterior surgical site noted.  Pt is aware and alert and answered all questions cooperatively. Cranial nerve testing was intact.   Smell and taste were not tested.   Visual fields were full.  There was no difficulty with finger to nose response.   Romberg testing was negative.   There was no dysmetria of the upper extremities. Reflexes revealed 2+ and symmetrical  Manual muscle testing of the bilateral upper extremities defer proximal testing to left shoulder exam otherwise intact.  Sensory examination revealed sensation was intact.  Vibratory and proprioceptive testing were intact.   Peripheral pulses were palpable bilaterally.   Trimble Test was negative.   Tinel's Test was negative at the wrists bilaterally.   The Spurling Cervical Compression Test was  equivocal   The Adsons Maneuver was negative bilaterally.  No scapular winging was noted.   There was good scapular mobility.   Range of motion testing was performed with the use of a goniometer.  On range of motion testing, flexion was to 40 degrees (normal - 45), extension was to 25 degrees  (normal - 55), right rotation was to 65 degrees (normal - 70), left rotation was to 55 degrees (normal - 70), right lateral bending was to 20 degrees  (normal - 40), and left lateral bending was to 25 degrees (normal - 40).   On palpation, of the cervical spine there was tenderness involving numerous cervical spinous processes. Tenderness and spasm involving the bilateral cervical paraspinal musculature with greater involvement on the left. Trigger points involving the bilateral trapezii musculature with greater involvement on the left. Tenderness and spasm involving bilateral levator scapulae with greater involvement on the left.   EXAMINATION OF THE LEFT SHOULDER:  Upon inspection, arthroscopic sites noted.  On palpation, tenderness involving greater trochanteric region. Bicipital tendon is tender.  Range of motion testing was performed with the use of a goniometer.   On range of motion testing, active flexion was to 150 degrees,  (normal flexion - 180),  active abduction was to 145 degrees,  (normal abduction - 180), active external rotation was to 70 degrees (normal external rotation - 90),  active internal rotation was to 45 degrees (normal internal rotation - 90), active extension was to 40 degrees, (normal extension - 45-60).   Drop Arm Test was positive  Yergasons is negative  Anterior and Posterior Apprehension Testing was negative There was a positive Impingement Sign.   Supraspinatus Test was positive  MMT; Flexion 4+/5 abduction 4/5, external rotation 5-/5, internal rotation 5/5.

## 2025-02-04 NOTE — ASSESSMENT
[FreeTextEntry1] :  Prednisone 5 mg 2 tabs po q6 hrs x 4 days  1 tab 5mg q6hrs x 3 days  1 tab 5mg po q12hrs x 2 days  1 tab 5 mg po qd x 1 day  # 49   Skelaxin 800 mg po TID # 60     Reviewed home exercise program with patient.  Stressed the importance of cervical spine range of motion trap and rhomboid stretching scapular mobilization isometrics to the C-spine, proper posturing and body mechanics as well as ergonomics Recheck in 2 to 3 weeks if symptoms persist will initiate trial of TPI therapy. Goals of which are to decrease pain, dissipate muscle spasm, increase ROM and improve level of function.  (Patient's insurance carrier does not provide acupuncture benefits)  At least 60 minutes was spent with patient face to face examining patient, reviewing findings/results, counseling patient and coordinating treatment program. Ample time was provided to answer any questions or address concerns to the patients satisfaction.

## 2025-03-05 NOTE — ASSESSMENT
[FreeTextEntry1] : Skelaxin 800 mg po TID # 60 PRN    Reviewed home exercise program with patient.  Stressed the importance of cervical spine range of motion trap and rhomboid stretching scapular mobilization isometrics to the C-spine, proper posturing and body mechanics as well as ergonomics  Recheck in 2 to 3 weeks if symptoms persist will initiate trial of TPI therapy. Goals of which are to decrease pain, dissipate muscle spasm, increase ROM and improve level of function.  (Patient's insurance carrier does not provide acupuncture benefits)   At least 20 minutes was spent with patient face to face examining patient, reviewing findings/results, counseling patient and coordinating treatment program. Ample time was provided to answer any questions or address concerns to the patient's satisfaction.

## 2025-03-05 NOTE — PHYSICAL EXAM
[FreeTextEntry1] : EXAMINATION OF THE CERVICAL SPINE AND UPPER EXTREMITIES: Reflexes revealed 2+ and symmetrical  Manual muscle testing of the bilateral upper extremities defer proximal testing to left shoulder exam otherwise intact.  Sensory examination revealed sensation was intact.  Vibratory and proprioceptive testing were intact.   The Spurling Cervical Compression Test was negative  No scapular winging was noted.   There was good scapular mobility.   Range of motion testing was performed with the use of a goniometer.  On range of motion testing, flexion was to 40 degrees (normal - 45), extension was to 20 degrees  (normal - 55), right rotation was to 65 degrees (normal - 70), left rotation was to 50 degrees (normal - 70), right lateral bending was to 20 degrees  (normal - 40), and left lateral bending was to 25 degrees (normal - 40).   On palpation, of the cervical spine there was tenderness involving numerous cervical spinous processes. Tenderness and spasm involving the bilateral cervical paraspinal musculature with greater involvement on the left. Trigger points involving the bilateral trapezii musculature with greater involvement on the left. Tenderness and spasm involving bilateral levator scapulae with greater involvement on the left.   EXAMINATION OF THE LEFT SHOULDER:  Upon inspection, arthroscopic sites noted.  On palpation, tenderness involving greater trochanteric region. Bicipital tendon is tender.  Range of motion testing was performed with the use of a goniometer.   On range of motion testing, active flexion was to 165 degrees,  (normal flexion - 180),  active abduction was to 160 ndegrees,  (normal abduction - 180), active external rotation was to 80 degrees (normal external rotation - 90),  active internal rotation was to 60 degrees (normal internal rotation - 90), active extension was to 40 degrees, (normal extension - 45-60).   Drop Arm Test was negative  Yergasons is negative  Anterior and Posterior Apprehension Testing was negative There was a mildly positive Impingement Sign.   Supraspinatus Test was mildly positive  MMT; Flexion 5-/5 abduction 4+/5, external rotation 5-/5, internal rotation 5/5.

## 2025-03-05 NOTE — END OF VISIT
[FreeTextEntry3] :    This note was generated by using Dragon medical dictation software. A reasonable effort has been made for proofreading its contents, but typos may still remain. If there are any questions or points of clarification needed, please notify my office.

## 2025-03-19 NOTE — HISTORY OF PRESENT ILLNESS
[FreeTextEntry1] : Patient reports overall improvement however she continues to complain of C-spine pain with greater involvement on the left with associated cervical paraspinal parascapular muscle spasm.  Presents today for reevaluation

## 2025-03-19 NOTE — PHYSICAL EXAM
[FreeTextEntry1] : EXAMINATION OF THE CERVICAL SPINE AND UPPER EXTREMITIES: Reflexes revealed 2+ and symmetrical  Manual muscle testing mild weakness with right shoulder flexion and abduction otherwise grossly intact Sensory examination revealed sensation was intact.  Vibratory and proprioceptive testing were intact.   The Spurling Cervical Compression Test was negative  No scapular winging was noted.   There was good scapular mobility.   Range of motion testing was performed with the use of a goniometer.  On range of motion testing, flexion was to 45 degrees (normal - 45), extension was to 25 degrees  (normal - 55), right rotation was to 65 degrees (normal - 70), left rotation was to 55 degrees (normal - 70), right lateral bending was to 20 degrees  (normal - 40), and left lateral bending was to 25 degrees (normal - 40).   On palpation, of the cervical spine : Isolated trigger points identified involving the left trapezius left levator scapula and left supraspinatus musculature After today's examination additional trigger points were identified involving the left trapezius left levator scapula and left supraspinatus musculature, thus indicating the need for additional trigger point therapy.  Goals of which are to decrease pain, dissipate muscle spasm, increase ROM and improve level of function.   Sterile Technique Injection 2 Syringes of 5 cc 1 % Lidocaine HCL left trapezius left levator scapula and left supraspinatus musculature Ice injection site PRN Injection tolerated well.   Multiple areas were identified using palpation guidance. Using aseptic technique, each of these areas left trapezius left levator scapula and left supraspinatus musculature were isolated and the skin prepped with alcohol. A 22 gauge 1 inch needle was inserted to the level of the muscle. At this point, a slight twitch was elicited and in some cases the patient identified referred pain to areas distant from the injection site. All of these were consistent with the definition of a trigger point. Aspiration revealed no blood and a mixture of 5cc 1 % Lidocaine HCL was injected in increments of 3-4 mL into each of these areas for a total of 4 sites. The needle was removed. Hemostasis was achieved with direct pressure.  The patient tolerated the procedure well. Post-procedure exam did not reveal any new neurological deficits. The patient was instructed to call with fever, chills, increased pain, redness or swelling at the injection site, or numbness or weakness in the upper extremities. The patient was discharged home in good condition with post-procedural instructions  At least 20 minutes was spent with patient face to face examining patient, reviewing findings/results, counseling patient and coordinating treatment program. Ample time was provided to answer any questions or address concerns to the patient's satisfaction.   Recheck 1- 2 weeks to assess the need for continuing TPI therapy.

## 2025-03-19 NOTE — ASSESSMENT
[FreeTextEntry1] :    Reviewed home exercise program with patient.  Stressed the importance of cervical spine range of motion trap and rhomboid stretching scapular mobilization isometrics to the C-spine, proper posturing and body mechanics as well as ergonomics

## 2025-03-26 NOTE — HISTORY OF PRESENT ILLNESS
[FreeTextEntry1] : Patient reports tolerating last session of TPI therapy well.  Notes decreased cervical spine pain and diminished muscle spasm involving the cervical paraspinal and parascapular musculature.  Continues to experience neck pain primarily left-sided.

## 2025-03-26 NOTE — PHYSICAL EXAM
[FreeTextEntry1] : EXAMINATION OF THE CERVICAL SPINE AND UPPER EXTREMITIES: Reflexes revealed 2+ and symmetrical  Manual muscle testing mild weakness with right shoulder flexion and abduction otherwise grossly intact Sensory examination revealed sensation was intact.  Vibratory and proprioceptive testing were intact.   The Spurling Cervical Compression Test was negative  No scapular winging was noted.   There was good scapular mobility.   Range of motion testing was performed with the use of a goniometer.  On range of motion testing, flexion was to 45 degrees (normal - 45), extension was to 25 degrees  (normal - 55), right rotation was to 65 degrees (normal - 70), left rotation was to 60 degrees (normal - 70), right lateral bending was to 25 degrees  (normal - 40), and left lateral bending was to 30 degrees (normal - 40).   On palpation, of the cervical spine : Isolated trigger points identified involving the left trapezius left levator scapula and left supraspinatus musculature After today's examination additional trigger points were identified involving the left trapezius left levator scapula and left supraspinatus musculature, thus indicating the need for additional trigger point therapy.  Goals of which are to decrease pain, dissipate muscle spasm, increase ROM and improve level of function.   Sterile Technique Injection 2 Syringes of 5 cc 1 % Lidocaine HCL left trapezius left levator scapula and left supraspinatus musculature Ice injection site PRN Injection tolerated well.   Multiple areas were identified using palpation guidance. Using aseptic technique, each of these areas left trapezius left levator scapula and left supraspinatus musculature were isolated and the skin prepped with alcohol. A 22 gauge 1 inch needle was inserted to the level of the muscle. At this point, a slight twitch was elicited and in some cases the patient identified referred pain to areas distant from the injection site. All of these were consistent with the definition of a trigger point. Aspiration revealed no blood and a mixture of 5cc 1 % Lidocaine HCL was injected in increments of 3-4 mL into each of these areas for a total of 4 sites. The needle was removed. Hemostasis was achieved with direct pressure.  The patient tolerated the procedure well. Post-procedure exam did not reveal any new neurological deficits. The patient was instructed to call with fever, chills, increased pain, redness or swelling at the injection site, or numbness or weakness in the upper extremities. The patient was discharged home in good condition with post-procedural instructions  At least 20 minutes was spent with patient face to face examining patient, reviewing findings/results, counseling patient and coordinating treatment program. Ample time was provided to answer any questions or address concerns to the patient's satisfaction.   Recheck 1- 2 weeks to assess the need for continuing TPI therapy.

## 2025-04-02 NOTE — PHYSICAL EXAM
[FreeTextEntry1] : EXAMINATION OF THE CERVICAL SPINE AND UPPER EXTREMITIES: Reflexes revealed 2+ and symmetrical  Manual muscle testing mild weakness with right shoulder flexion and abduction otherwise grossly intact Sensory examination revealed sensation was intact.  The Spurling Cervical Compression Test was negative  No scapular winging was noted.   There was good scapular mobility.   Range of motion testing was performed with the use of a goniometer.  On range of motion testing, flexion was to 45 degrees (normal - 45), extension was to 35 degrees  (normal - 55), right rotation was to 65 degrees (normal - 70), left rotation was to 65 degrees (normal - 70), right lateral bending was to 30degrees  (normal - 40), and left lateral bending was to 35 degrees (normal - 40).   On palpation, of the cervical spine : Isolated trigger points identified involving the left supraspinatus, left levator scapula, left infraspinatus musculature After today's examination additional trigger points were identified involving the left supraspinatus, left levator scapula, left infraspinatus musculature  thus indicating the need for additional trigger point therapy.  Goals of which are to decrease pain, dissipate muscle spasm, increase ROM and improve level of function.   Sterile Technique Injection 2 Syringes of 5 cc 1 % Lidocaine HCL  left supraspinatus, left levator scapula, left infraspinatus musculature Ice injection site PRN Injection tolerated well.   Multiple areas were identified using palpation guidance. Using aseptic technique, each of these areas left supraspinatus, left levator scapula, left infraspinatus musculature  were isolated and the skin prepped with alcohol. A 22 gauge 1 inch needle was inserted to the level of the muscle. At this point, a slight twitch was elicited and in some cases the patient identified referred pain to areas distant from the injection site. All of these were consistent with the definition of a trigger point. Aspiration revealed no blood and a mixture of 5cc 1 % Lidocaine HCL was injected in increments of 3-4 mL into each of these areas for a total of 4 sites. The needle was removed. Hemostasis was achieved with direct pressure.  The patient tolerated the procedure well. Post-procedure exam did not reveal any new neurological deficits. The patient was instructed to call with fever, chills, increased pain, redness or swelling at the injection site, or numbness or weakness in the upper extremities. The patient was discharged home in good condition with post-procedural instructions  At least 20 minutes was spent with patient face to face examining patient, reviewing findings/results, counseling patient and coordinating treatment program. Ample time was provided to answer any questions or address concerns to the patient's satisfaction.   Recheck 1- 2 weeks to assess the need for continuing TPI therapy.

## 2025-04-02 NOTE — HISTORY OF PRESENT ILLNESS
[FreeTextEntry1] : Patient reports she continues to find TPI therapy beneficial.  Reporting decreasing cervical spine pain improved range of motion.  Patient reports she has been able to increase her level of physical activity.

## 2025-04-09 NOTE — HISTORY OF PRESENT ILLNESS
[FreeTextEntry1] : Patient reports she continues to find TPI therapy beneficial in terms of decreasing C-spine pain and dissipating muscle spasm.  Continues to complain of some left-sided cervical spine discomfort with extremes of rotation and right lateral bending.  Denies radicular symptoms involving upper extremities.  Presents today for reevaluation

## 2025-04-09 NOTE — PHYSICAL EXAM
[FreeTextEntry1] : EXAMINATION OF THE CERVICAL SPINE AND UPPER EXTREMITIES: Reflexes revealed 2+ and symmetrical  Manual muscle testing mild weakness with right shoulder flexion and abduction otherwise grossly intact Sensory examination revealed sensation was intact.  The Spurling Cervical Compression Test was negative  No scapular winging was noted.   There was good scapular mobility.   Range of motion testing was performed with the use of a goniometer.  On range of motion testing, flexion was to 45 degrees (normal - 45), extension was to 40 degrees  (normal - 55), right rotation was to 70 degrees (normal - 70), left rotation was to 65 degrees (normal - 70), right lateral bending was to 30degrees (normal - 40), and left lateral bending was to 35 degrees (normal - 40).   On palpation, of the cervical spine: Isolated trigger points identified involving the left C6 cervical paraspinal musculature left supraspinatus, left trapezius After today's examination additional trigger points were identified involving the left C6 cervical paraspinal musculature left supraspinatus, left trapezius thus indicating the need for additional trigger point therapy.  Goals of which are to decrease pain, dissipate muscle spasm, increase ROM and improve level of function.   Sterile Technique Injection 2 Syringes of 5 cc 1 % Lidocaine HCL left C6 cervical paraspinal musculature left supraspinatus, left trapezius Ice injection site PRN Injection tolerated well.   Multiple areas were identified using palpation guidance. Using aseptic technique, each of these areas left C6 cervical paraspinal musculature left supraspinatus, left trapezius were isolated and the skin prepped with alcohol. A 22 gauge 1 inch needle was inserted to the level of the muscle. At this point, a slight twitch was elicited and in some cases the patient identified referred pain to areas distant from the injection site. All of these were consistent with the definition of a trigger point. Aspiration revealed no blood and a mixture of 5cc 1 % Lidocaine HCL was injected in increments of 3-4 mL into each of these areas for a total of 3 sites. The needle was removed. Hemostasis was achieved with direct pressure.  The patient tolerated the procedure well. Post-procedure exam did not reveal any new neurological deficits. The patient was instructed to call with fever, chills, increased pain, redness or swelling at the injection site, or numbness or weakness in the upper extremities. The patient was discharged home in good condition with post-procedural instructions  At least 20 minutes was spent with patient face to face examining patient, reviewing findings/results, counseling patient and coordinating treatment program. Ample time was provided to answer any questions or address concerns to the patient's satisfaction.   Recheck 2 weeks To assess clinical response to TPI therapy and need for ongoing treatment.

## 2025-04-16 NOTE — HISTORY OF PRESENT ILLNESS
[FreeTextEntry1] : Patient reports she continues to find TPI therapy beneficial in terms of decreasing C-spine pain and dissipating muscle spasm.  Continues to complain of some left-sided cervical spine discomfort with extremes of rotation and right lateral bending.  Pt reports recent exacerbation of neck pain. Presents today for reevaluation

## 2025-04-16 NOTE — PHYSICAL EXAM
Caller: Sandra Blanco    Relationship: Emergency Contact    Best call back number: [phone]    What is the best time to reach you: WHENEVER DR. PATEL HAS TIME TODAY    Who are you requesting to speak with (clinical staff, provider,  specific staff member): DR. PATEL    What was the call regarding: PT'S WIFE IS CALLING IN REGARDS TO HER  AND DOES NOT WANT TO DISCUSS ANY INFORMATION WITH ANYONE OTHER THAN DR. PATEL.    Do you require a callback: YES     [FreeTextEntry1] : EXAMINATION OF THE CERVICAL SPINE AND UPPER EXTREMITIES: Reflexes revealed 2+ and symmetrical  Manual muscle testing mild weakness with right shoulder flexion and abduction otherwise grossly intact Sensory examination revealed sensation was intact.  The Spurling Cervical Compression Test was negative  No scapular winging was noted.   There was good scapular mobility.   Range of motion testing was performed with the use of a goniometer.  On range of motion testing, flexion was to 45 degrees (normal - 45), extension was to 40 degrees  (normal - 55), right rotation was to 65 degrees (normal - 70), left rotation was to 65 degrees (normal - 70), right lateral bending was to 25 degrees (normal - 40), and left lateral bending was to 35 degrees (normal - 40).   On palpation, of the cervical spine: Isolated trigger points identified involving the left C6 cervical paraspinal musculature left supraspinatus, left trapezius After today's examination additional trigger points were identified involving the left C6 cervical paraspinal musculature left supraspinatus, left trapezius thus indicating the need for additional trigger point therapy.  Goals of which are to decrease pain, dissipate muscle spasm, increase ROM and improve level of function.   Sterile Technique Injection 2 Syringes of  4cc 1 % Lidocaine HCL 1 cc dexamethasone  left C6 cervical paraspinal musculature left supraspinatus, left trapezius Ice injection site PRN Injection tolerated well.   Multiple areas were identified using palpation guidance. Using aseptic technique, each of these areas left C6 cervical paraspinal musculature left supraspinatus, left trapezius were isolated and the skin prepped with alcohol. A 22 gauge 1 inch needle was inserted to the level of the muscle. At this point, a slight twitch was elicited and in some cases the patient identified referred pain to areas distant from the injection site. All of these were consistent with the definition of a trigger point. Aspiration revealed no blood and a mixture of 5cc 1 % Lidocaine HCL was injected in increments of 3-4 mL into each of these areas for a total of 3 sites. The needle was removed. Hemostasis was achieved with direct pressure.  The patient tolerated the procedure well. Post-procedure exam did not reveal any new neurological deficits. The patient was instructed to call with fever, chills, increased pain, redness or swelling at the injection site, or numbness or weakness in the upper extremities. The patient was discharged home in good condition with post-procedural instructions  At least 20 minutes was spent with patient face to face examining patient, reviewing findings/results, counseling patient and coordinating treatment program. Ample time was provided to answer any questions or address concerns to the patient's satisfaction.   Recheck 2 weeks To assess clinical response to TPI therapy and need for ongoing treatment.

## 2025-05-01 NOTE — HISTORY OF PRESENT ILLNESS
[FreeTextEntry1] : Patient reports improvement following the session of TPI therapy as a relates to her cervical spine.  She reports decreased pain, and dissipation of muscle spasm.  She is not complaining of left-sided periscapular muscle discomfort.  Presents today for reevaluation.

## 2025-05-01 NOTE — PHYSICAL EXAM
[FreeTextEntry1] : EXAMINATION OF THE CERVICAL SPINE AND UPPER EXTREMITIES: Reflexes revealed 2+ and symmetrical  Manual muscle testing mild weakness with right shoulder flexion and abduction otherwise grossly intact Sensory examination revealed sensation was intact.  The Spurling Cervical Compression Test was negative  No scapular winging was noted.   There was good scapular mobility.  There was good scapular protraction retraction elevation and depression. There is no scapular winging Range of motion testing was performed with the use of a goniometer.  On range of motion testing, flexion was to 45 degrees (normal - 45), extension was to 45 degrees  (normal - 55), right rotation was to 70 degrees (normal - 70), left rotation was to 65 degrees (normal - 70), right lateral bending was to 30 degrees (normal - 40), and left lateral bending was to 35 degrees (normal - 40).   On palpation, of the cervical spine: Isolated trigger points identified involving the left levator scapula, left middle trapezius, left infraspinatus musculature After today's examination additional trigger points were identified involving the left levator scapula, left middle trapezius, left infraspinatus musculaturethus indicating the need for additional trigger point therapy.  Goals of which are to decrease pain, dissipate muscle spasm, increase ROM and improve level of function.   Sterile Technique Injection 2 Syringes of  4cc 1 % Lidocaine HCL 1 cc dexamethasone  left levator scapula, left middle trapezius, left infraspinatus musculature Ice injection site PRN Injection tolerated well.   Multiple areas were identified using palpation guidance. Using aseptic technique, each of these areas left levator scapula, left middle trapezius, left infraspinatus musculature were isolated and the skin prepped with alcohol. A 22 gauge 1 inch needle was inserted to the level of the muscle. At this point, a slight twitch was elicited and in some cases the patient identified referred pain to areas distant from the injection site. All of these were consistent with the definition of a trigger point. Aspiration revealed no blood and a mixture of 5cc 1 % Lidocaine HCL was injected in increments of 3-4 mL into each of these areas for a total of 3 sites. The needle was removed. Hemostasis was achieved with direct pressure.  The patient tolerated the procedure well. Post-procedure exam did not reveal any new neurological deficits. The patient was instructed to call with fever, chills, increased pain, redness or swelling at the injection site, or numbness or weakness in the upper extremities. The patient was discharged home in good condition with post-procedural instructions  At least 20 minutes was spent with patient face to face examining patient, reviewing findings/results, counseling patient and coordinating treatment program. Ample time was provided to answer any questions or address concerns to the patient's satisfaction.   Recheck 2 weeks To assess clinical response to TPI therapy and need for ongoing treatment.                  Winlevi Counseling:  I discussed with the patient the risks of topical clascoterone including but not limited to erythema, scaling, itching, and stinging. Patient voiced their understanding.

## 2025-05-12 NOTE — ASSESSMENT
[FreeTextEntry1] : Patient advised to use moist heat for muscle relaxation Continue to use cervical pillow Reviewed home exercise program with patient.  Stressed the importance of cervical spine range of motion trap and rhomboid stretching scapular mobilization isometrics to the C-spine, proper posturing and body mechanics as well as ergonomics Recheck in 3 to 4 weeks to assess clinical status and need for treatment.  At least 20 minutes was spent with patient face to face examining patient, reviewing findings/results, counseling patient and coordinating treatment program. Ample time was provided to answer any questions or address concerns to the patient's satisfaction.

## 2025-05-12 NOTE — HISTORY OF PRESENT ILLNESS
[FreeTextEntry1] : Patient reports significant improvement with TPI therapy as a relates to her cervical spine.  She reports decreased pain, improved range of motion and dissipation of muscle spasm.  She reports that she is able to form ADL activities as well as household chores with less difficulty since initiating TPI therapy.  Patient denies any radicular symptoms involving her upper extremities at the current time.  Continues to complain of intermittent left side C-spine pain.  Presents today for reevaluation.

## 2025-07-08 NOTE — HISTORY OF PRESENT ILLNESS
[de-identified] : Date of initial evaluation: 07/08/2025 Patient age: 45 year Body part causing symptoms: right elbow  Location of the pain: lateral Pain score today: 8/10 Duration of symptoms: 2 weeks ago History of injury: Patient states after she placed a basket on her table, she hit her elbow on the corner of the chair. pain with gripping since that time.  Activities that worsen the pain: making fist, brushing hair Radicular symptoms: intermittent tingling in forearm  Medications attempted for this problem: naproxen PT for this problem: no Injections for this problem: no Previous surgery on this body part: no Prior imaging: [facility] XR  Occupation: not working

## 2025-07-08 NOTE — HISTORY OF PRESENT ILLNESS
[FreeTextEntry1] : Patient reports overall making good progress with TPI therapy as a relates to her cervical spine.  She reports decreased pain dissipation of muscle spasm and improved range of motion.  She denies radicular symptoms involving her upper extremities at the current time.  Presents today for reevaluation

## 2025-07-08 NOTE — IMAGING
[de-identified] : (Exam: Elbow)   Laterality is right   Patient is in no acute distress, alert and oriented Sensation is grossly intact to light touch in the hand Motor function is 5/5 in the hand Capillary refill is less than 2 seconds in the fingers Lymphadenopathy is not present Peripheral edema is not present   Skin is intact Olecranon bursa swelling is not present Biceps deformity is not present Intrinsic atrophy is not present   Lateral epicondyle tenderness is present Medial epicondyle tenderness is not present Radial head tenderness is not present Biceps tenderness is not present Triceps tenderness is not present   Extension is 0 Flexion is 140 Pronation is 80 Supination is 80   Flexion strength is 5/5 Extension strength is 5/5 Pronation strength is 5/5 Supination strength is 5/5   Cozen's test is abnormal Biceps hook test is normal Tinel's test of ulnar nerve is normal Moving valgus stress test is normal  [Right] : right elbow [Outside films reviewed] : Outside films reviewed [There are no fractures, subluxations or dislocations. No significant abnormalities are seen] : There are no fractures, subluxations or dislocations. No significant abnormalities are seen

## 2025-07-08 NOTE — DISCUSSION/SUMMARY
[de-identified] : (Impression) -right elbow lateral epicondylitis  The diagnosis was explained in detail. The potential non-surgical and surgical treatments were reviewed. The relative risks and benefits of each option were considered relative to the patient age, activity level, medical history, symptom severity and previously attempted treatments.  The patient was advised to consult with their primary medical provider prior to initiation of any new medications to reduce the risk of adverse effects specific to their long-term home medications and medical history. The risk of gastrointestinal irritation and kidney injury specific to long-term NSAID use was discussed.   (Plan) -Physical therapy recommended for stretching and strengthening. -Cortisone injection is deferred at this time. -Naproxen for pain and inflammation, take as needed. -Topical voltaren gel as needed for pain. -MRI is deferred at this time. -Begin use of counterforce strap during activity and removable wrist brace at night. -Follow up in 6 to 8 weeks. If symptoms persist consider CSI vs MRI.   (MDM) Problem Complexity -Moderate: chronic illness with exacerbation   Risk -Moderate: prescription medication   Visit Type -Established

## 2025-07-08 NOTE — PHYSICAL EXAM
[FreeTextEntry1] : EXAMINATION OF THE CERVICAL SPINE AND UPPER EXTREMITIES: Reflexes revealed 2+ and symmetrical  Manual muscle testing mild weakness with right shoulder flexion and abduction otherwise grossly intact Sensory examination revealed sensation was intact.  The Spurling Cervical Compression Test was negative  No scapular winging was noted.   Range of motion testing was performed with the use of a goniometer.  On range of motion testing, flexion was to 45 degrees (normal - 45), extension was to 40 degrees  (normal - 55), right rotation was to 70 degrees (normal - 70), left rotation was to 65 degrees (normal - 70), right lateral bending was to 30 degrees (normal - 40), and left lateral bending was to 35 degrees (normal - 40).   On palpation, of the cervical spine: Isolated trigger points identified involving the left trapezius left levator scapula left C6 cervical paraspinal musculature  After today's examination additional trigger points were identified involving the left trapezius left levator scapula left C6 cervical paraspinal musculature thus indicating the need for additional trigger point therapy.  Goals of which are to decrease pain, dissipate muscle spasm, increase ROM and improve level of function.   Sterile Technique Injection 2 Syringes of 5 cc 1 % Lidocaine HCL  left trapezius left levator scapula left C6 cervical paraspinal musculature Ice injection site PRN Injection tolerated well.   Multiple areas were identified using palpation guidance. Using aseptic technique, each of these areas  left trapezius left levator scapula left C6 cervical paraspinal musculature were isolated and the skin prepped with alcohol. A 22 gauge 1 inch needle was inserted to the level of the muscle. At this point, a slight twitch was elicited and in some cases the patient identified referred pain to areas distant from the injection site. All of these were consistent with the definition of a trigger point. Aspiration revealed no blood and a mixture of 5cc 1 % Lidocaine HCL was injected in increments of 3-4 mL into each of these areas for a total of 3 sites. The needle was removed. Hemostasis was achieved with direct pressure.  The patient tolerated the procedure well. Post-procedure exam did not reveal any new neurological deficits. The patient was instructed to call with fever, chills, increased pain, redness or swelling at the injection site, or numbness or weakness in the upper extremities. The patient was discharged home in good condition with post-procedural instructions   At least 20 minutes was spent with patient face to face examining patient, reviewing findings/results, counseling patient and coordinating treatment program. Ample time was provided to answer any questions or address concerns to the patient's satisfaction.   Recheck in 4 weeks to assess clinical response to TPI therapy and need for treatment

## 2025-07-30 NOTE — HISTORY OF PRESENT ILLNESS
[FreeTextEntry1] : Patient reports improvement following the session of TPI therapy.  Reports decreased pain involving her cervical spine.  Complaining of some discomfort involving the left periscapular region.  Denies radicular symptoms involving upper extremities.  Presents today for reevaluation

## 2025-07-30 NOTE — PHYSICAL EXAM
[FreeTextEntry1] : EXAMINATION OF THE CERVICAL SPINE AND UPPER EXTREMITIES: Reflexes revealed 2+ and symmetrical  Manual muscle testing mild weakness with right shoulder flexion and abduction otherwise grossly intact Sensory examination revealed sensation was intact.  The Spurling Cervical Compression Test was negative  No scapular winging was noted.   Range of motion testing was performed with the use of a goniometer.  On range of motion testing, flexion was to 45 degrees (normal - 45), extension was to 40 degrees  (normal - 55), right rotation was to 70 degrees (normal - 70), left rotation was to 70 degrees (normal - 70), right lateral bending was to 30 degrees (normal - 40), and left lateral bending was to 35 degrees (normal - 40). There was good protraction retraction elevation and depression of the scapula On palpation, of the cervical spine: Isolated trigger points identified involving the left levator scapula, left rhomboid, left infraspinatus musculature  After today's examination additional trigger points were identified involving the  left levator scapula, left rhomboid, left infraspinatus musculature thus indicating the need for additional trigger point therapy.  Goals of which are to decrease pain, dissipate muscle spasm, increase ROM and improve level of function.   Sterile Technique Injection 2 Syringes of 5 cc 1 % Lidocaine HCL   left levator scapula, left rhomboid, left infraspinatus musculature Ice injection site PRN Injection tolerated well.   Multiple areas were identified using palpation guidance. Using aseptic technique, each of these areas left levator scapula, left rhomboid, left infraspinatus musculature were isolated and the skin prepped with alcohol. A 22 gauge 1 inch needle was inserted to the level of the muscle. At this point, a slight twitch was elicited and in some cases the patient identified referred pain to areas distant from the injection site. All of these were consistent with the definition of a trigger point. Aspiration revealed no blood and a mixture of 5cc 1 % Lidocaine HCL was injected in increments of 3-4 mL into each of these areas for a total of 3 sites. The needle was removed. Hemostasis was achieved with direct pressure.  The patient tolerated the procedure well. Post-procedure exam did not reveal any new neurological deficits. The patient was instructed to call with fever, chills, increased pain, redness or swelling at the injection site, or numbness or weakness in the upper extremities. The patient was discharged home in good condition with post-procedural instructions   At least 20 minutes was spent with patient face to face examining patient, reviewing findings/results, counseling patient and coordinating treatment program. Ample time was provided to answer any questions or address concerns to the patient's satisfaction.   Recheck in 4 weeks to assess clinical response to TPI therapy and need for treatment